# Patient Record
Sex: FEMALE | Race: WHITE
[De-identification: names, ages, dates, MRNs, and addresses within clinical notes are randomized per-mention and may not be internally consistent; named-entity substitution may affect disease eponyms.]

---

## 2018-08-31 NOTE — NUR
-------------------------------------------------------------------------------

            *** Note ericone in EDM - 08/31/18 at 0532 by MARY ELLEN ***            

-------------------------------------------------------------------------------

Patient discharged with v/s stable. Written and verbal after care instructions 
given and explained. 

Patient alert, oriented and verbalized understanding of instructions. 
Ambulatory with steady gait. All questions addressed prior to discharge. ID 
band removed. Patient advised to follow up with PMD. Rx of KEFLEX given. 
Patient educated on indication of medication including possible reaction and 
side effects. Opportunity to ask questions provided and answered. Pt and pt's 
caregiver instructed to follow up with neurosurgeon with printed results and CD 
tomorrow.
-------------------------------------------------------------------------------

            *** Note undone in [facility] - 08/31/18 at 0327 by PATRICIA ***            

-------------------------------------------------------------------------------

PT TAKEN TO BED 9
28/F CAME IN ED WITH SIGNIFICANT OTHER, C/O 10/10 HEADACHE WITH ASSOCIATED 
N/V/D, X3 DAYS. PT REPORTS HAVING SIMILAR EPISODES OF HEADACHE IN THE PAST. PT 
REPORTS "HOT FLASHES" AND EXCESSIVE SWEATING AT NIGHT. PT ALSO REPORTS POSSIBLE 
STUCK TAMPON. LMP 8/28/18, PT HAS A CONTRACEPTIVE IMPLANT ON LUE. PT REPORTS 
TAKING EXCEDRIN 7 HRS AGO WITH LITTLE RELIEF. LUNG SOUNDS CLEAR BL. BS ACTIVE 
X4, ABD SOFT ROUND NONTENDER. PT DENIES MED HX, RX. ER MD MADE AWARE.
Chapperoned  during pelvic exam at this time
Dr. Prater evalauting patient at bedside.
PT RESTING COMFORTABLY IN BED, REPORTS 5/10 HEADACHE, DENIES SUPRAPUBIC 
CRAMPING, VSS, ER MD MADE AWARE. ALL NEEDS MET.
PT TAKEN TO BED 9
Patient discharged with v/s stable. Written and verbal after care instructions 
given and explained. 

Patient alert, oriented and verbalized understanding of instructions. 
Ambulatory with steady gait. All questions addressed prior to discharge. ID 
band removed. Patient advised to follow up with PMD. Rx of zofran odt, bentyl, 
norco 5-325 given. Patient educated on indication of medication including 
possible reaction and side effects. Opportunity to ask questions provided and 
answered.
Private car

## 2019-07-12 NOTE — NUR
PT C/O OF HEADACHE X 2 DAYS. PAIN LEVEL 10/10, THROBBING AND SENSITIVE TO 
LIGHT. PT ALSO C/O OF PAIN BEHIND BOTH EARS. NO REDNESS NOTED. PT STATES SHE 
HAS N/V/D. VOMITED X1 30 MIN AGO. EPISODE OF [place] AT 0330. PT HAS HX OF 
MIGRAINES. SAFETY MEASURES IN PLACE. WAITING FOR ERMD TO EVALUATE PT.

## 2019-07-12 NOTE — NUR
-------------------------------------------------------------------------------

            *** Note undone in EDM - 07/12/19 at 0640 by [email or web address] ***            

-------------------------------------------------------------------------------

PT C/O OF HEADACHE X 2 DAYS. PAIN LEVEL 10/10, THROBBING AND SENSITIVE TO 
LIGHT. PT ALSO C/O OF PAIN BEHIND BOTH EARS. NO REDNESS NOTED. PT STATES SHE 
HAS N/V/D. VOMITED X1 30 MIN AGO. EPISODE OF [place] AT 0330. PT HAS HX OF 
MIGRAINES. SAFETY MEASURES IN PLACE. WAITING FOR ERMD TO EVALUATE PT.

## 2019-07-12 NOTE — NUR
Patient discharged with v/s stable. Written and verbal after care instructions 
given and explained. 

Patient alert, oriented and verbalized understanding of instructions. 
Ambulatory with steady gait. All questions addressed prior to discharge. ID 
band removed. Patient advised to follow up with PMD. Rx of MOTRIN 600 MG TAB, 
ZOFRAN 8 MG TAB AND NORCO 5-325 MG TAB given. Patient educated on indication of 
medication including possible reaction and side effects. Opportunity to ask 
questions provided and answered.

## 2020-01-21 NOTE — NUR
Patient discharged with v/s stable. Written and verbal after care instructions 
given and explained. 

Patient alert, oriented and verbalized understanding of instructions. 
Ambulatory with steady gait. All questions addressed prior to discharge. ID 
band removed. Patient advised to follow up with PMD. Rx of [employer] PRENATAL MULTI + 
DHA SOFTGEL given. Patient educated on indication of medication including 
possible reaction and side effects. Opportunity to ask questions provided and 
answered.

## 2020-01-21 NOTE — NUR
28 Y/O FEMALE PRESENTS TO ER STATING SHE WANTS PREGNANCY TEST AND ULTRASOUND 
DONE TO SEE IF CONFIRMED PREGNANT. PT STATES SHE WAS SEEN AT [facility] SUNDAY 
AND THEY SAID SHE WAS PREGNANT. DENIES PAIN. ABD SOFT, ROUND, NONTENDER TO 
PALP. LMP 11/26/19. DENIES VAGINAL BLEEDING. PT SITTING IN BED CALM AND 
PLEASANT. VSS



MEDHX: DENIES 

ALLERGIES: NKA

## 2020-01-26 NOTE — NUR
Patient discharged with v/s stable. Written and verbal after care instructions 
given and explained. 

Patient alert, oriented and verbalized understanding of instructions. 
Ambulatory with steady gait. All questions addressed prior to discharge. ID 
band removed. Patient advised to follow up with PMD. Rx of ZOFRAN, TAMIFLU, AND 
TYLENOL given. Patient educated on indication of medication including possible 
reaction and side effects. Opportunity to ask questions provided and answered.

## 2020-01-26 NOTE — NUR
29 Y/F BIB SELF C/O DRY COUGH, BODY ACHE, UPPER BACK PAIN, CHILLS, SORE CHEST 
9/10 X 2 DAY. PT ALSO C/O CRAMPING IN LOWER ABDOMEN, C/O NAUSEA, DENIES 
FREQUENCY, DYSURIA, VAGINAL BLEEDING OR D/C. LMP NOV 22. G-3, A-0 L-2, .



NKDA

RX- PRENATAL

## 2020-02-15 NOTE — NUR
Pelvic exam performed by DR. ROA with LYLE MUSE at bedside for entire 
examination.  Patient tolerated procedure WELL.  Patient assisted to position 
of comfort after examination.

## 2021-12-20 ENCOUNTER — HOSPITAL ENCOUNTER (EMERGENCY)
Dept: HOSPITAL 26 - MED | Age: 31
Discharge: HOME | End: 2021-12-20
Payer: COMMERCIAL

## 2021-12-20 VITALS — DIASTOLIC BLOOD PRESSURE: 71 MMHG | SYSTOLIC BLOOD PRESSURE: 126 MMHG

## 2021-12-20 VITALS — BODY MASS INDEX: 25.72 KG/M2 | WEIGHT: 136.25 LBS | HEIGHT: 61 IN

## 2021-12-20 VITALS — SYSTOLIC BLOOD PRESSURE: 126 MMHG | DIASTOLIC BLOOD PRESSURE: 71 MMHG

## 2021-12-20 DIAGNOSIS — G43.909: Primary | ICD-10-CM

## 2021-12-20 PROCEDURE — 96374 THER/PROPH/DIAG INJ IV PUSH: CPT

## 2021-12-20 PROCEDURE — 81025 URINE PREGNANCY TEST: CPT

## 2021-12-20 PROCEDURE — 81002 URINALYSIS NONAUTO W/O SCOPE: CPT

## 2021-12-20 PROCEDURE — 93005 ELECTROCARDIOGRAM TRACING: CPT

## 2021-12-20 PROCEDURE — 99284 EMERGENCY DEPT VISIT MOD MDM: CPT

## 2021-12-20 PROCEDURE — 96375 TX/PRO/DX INJ NEW DRUG ADDON: CPT

## 2021-12-20 RX ADMIN — ACETAMINOPHEN ONE MG: 500 TABLET ORAL at 09:35

## 2021-12-20 RX ADMIN — PROCHLORPERAZINE EDISYLATE ONE MG: 5 INJECTION INTRAMUSCULAR; INTRAVENOUS at 09:35

## 2021-12-20 RX ADMIN — IBUPROFEN ONE MG: 400 TABLET ORAL at 09:35
